# Patient Record
Sex: MALE | Race: WHITE
[De-identification: names, ages, dates, MRNs, and addresses within clinical notes are randomized per-mention and may not be internally consistent; named-entity substitution may affect disease eponyms.]

---

## 2021-06-21 ENCOUNTER — HOSPITAL ENCOUNTER (EMERGENCY)
Dept: HOSPITAL 38 - CC.ED | Age: 36
Discharge: HOME | End: 2021-06-21
Payer: COMMERCIAL

## 2021-06-21 VITALS — DIASTOLIC BLOOD PRESSURE: 77 MMHG | SYSTOLIC BLOOD PRESSURE: 118 MMHG | HEART RATE: 83 BPM

## 2021-06-21 DIAGNOSIS — T78.40XA: Primary | ICD-10-CM

## 2021-06-21 NOTE — EDM.PDOC
ED HPI GENERAL MEDICAL PROBLEM





- General


Chief Complaint: Allergic Reaction


Stated Complaint: allergic reaction


Time Seen by Provider: 06/21/21 03:38


  ** Lower Back


Pain Score (Numeric/FACES): 6





- Related Data


                                    Allergies











Allergy/AdvReac Type Severity Reaction Status Date / Time


 


No Known Allergies Allergy   Verified 06/21/21 03:47











Home Meds: 


                                    Home Meds





. [No Known Home Meds]  06/21/21 [History]











Past Medical History





- Past Health History


Medical/Surgical History: Denies Medical/Surgical History





Social & Family History





- Tobacco Use


Tobacco Use Status *Q: Never Tobacco User





ED ROS ALLERGIC REACTION





- Review of Systems


Review Of Systems: Unable To Obtain


Reason Not Obtained: Pt was seen by MADELEINE Milner.  See their documentation.





ED EXAM GENERAL NO PERIP PULSE





- Physical Exam


Exam: Not Obtained


Reason Not Obtained: See notes from MADELEINE Milner for initial assessment.





Course





- Vital Signs


Last Recorded V/S: 


                                Last Vital Signs











Temp  98.4 F   06/21/21 06:17


 


Pulse  83   06/21/21 06:17


 


Resp  18   06/21/21 06:17


 


BP  118/77   06/21/21 06:17


 


Pulse Ox  95   06/21/21 06:17














- Orders/Labs/Meds


Meds: 


Medications














Discontinued Medications














Generic Name Dose Route Start Last Admin





  Trade Name Freq  PRN Reason Stop Dose Admin


 


Cyclobenzaprine HCl  10 mg  06/21/21 04:36  06/21/21 04:44





  Cyclobenzaprine 10 Mg Tab  PO  06/21/21 04:37  10 mg





  ONETIME ONE   Administration


 


Diphenhydramine HCl  25 mg  06/21/21 04:13  06/21/21 03:45





  Diphenhydramine 50 Mg/Ml Sdv  IVPUSH  06/21/21 04:14  25 mg





  ONETIME ONE   Administration


 


Epinephrine HCl  0.3 mg  06/21/21 04:13  06/21/21 03:35





  Epinephrine 1 Mg/Ml Sdv  IM  06/21/21 04:14  0.3 mg





  ONETIME ONE   Administration


 


Famotidine  Confirm  06/21/21 03:23  06/21/21 04:15





  Famotidine 20 Mg/2 Ml Sdv  Administered  06/21/21 03:24  Not Given





  Dose  





  20 mg  





  .ROUTE  





  .STK-MED ONE  


 


Famotidine  20 mg  06/21/21 04:13  06/21/21 03:50





  Famotidine 20 Mg/2 Ml Sdv  IVPUSH  06/21/21 04:14  20 mg





  ONETIME ONE   Administration


 


Methylprednisolone Sodium Succinate  125 mg  06/21/21 04:13  06/21/21 03:45





  Methylprednisolone Sodium Succinate 125 Mg/2 Ml Sdv  IVPUSH  06/21/21 04:14  

125 mg





  NOW STA   Administration














- Re-Assessments/Exams


Free Text/Narrative Re-Assessment/Exam: 





06/21/21 0700  Informed by nursing staff that pt was an extended ER.  Having 

been seen earlier for allergic reaction and treated via [facility] and was stable 

and they recommended that he be watched for several hours to ensure that it did 

not reoccur.


Pt is sleeping when I arrived stating that he is feeling much better.  Mild 

itching to his hands bilaterally.  Denies any throat tightness, SOB or cough.  

No further GI symptoms noted.  


States that he feels weak but is able to get up and walk around.  He continues 

to have some back pain and muscles spasms that started earlier in the evening.


Lungs are clear to all, throat is not red or swollen.  Abdomen is soft and non 

tender to touch.  No rash or redness noted to skin.  Alert and oriented.


Pt is stable and will be discharged with steroids, and Pepcid.  Will also give 

Flexeril for the back spasms.


Follow up with PCP with new concerns or as needed.





Departure





- Departure


Time of Disposition: 07:31


Disposition: Home, Self-Care 01


Condition: Good


Clinical Impression: 


Allergic reaction


Qualifiers:


 Encounter type: initial encounter Qualified Code(s): T78.40XA - Allergy, 

unspecified, initial encounter








- Discharge Information


*PRESCRIPTION DRUG MONITORING PROGRAM REVIEWED*: Not Applicable


*COPY OF PRESCRIPTION DRUG MONITORING REPORT IN PATIENT [facility]: Not Applicable


Referrals: 


PCP,None [Primary Care Provider] - 


Forms:  ED Department Discharge


Additional Instructions: 


Prednisone 40 mg daily for 5 days


Pepcid 20 mg daily for the next week


Flexeril 10 mg up to 3 times a day for back spasms


recheck with any new concerns.





Sepsis Event Note (ED)





- Evaluation


Sepsis Screening Result: No Definite Risk





- Problem List & Annotations


(1) Allergic reaction


SNOMED Code(s): 037505204


   Code(s): T78.40XA - ALLERGY, UNSPECIFIED, INITIAL ENCOUNTER   Status: Acute  




Qualifiers: 


   Encounter type: initial encounter   Qualified Code(s): T78.40XA - Allergy, 

unspecified, initial encounter   





- Problem List Review


Problem List Initiated/Reviewed/Updated: Yes